# Patient Record
Sex: MALE | Race: WHITE | NOT HISPANIC OR LATINO | Employment: FULL TIME | URBAN - METROPOLITAN AREA
[De-identification: names, ages, dates, MRNs, and addresses within clinical notes are randomized per-mention and may not be internally consistent; named-entity substitution may affect disease eponyms.]

---

## 2017-10-22 ENCOUNTER — HOSPITAL ENCOUNTER (EMERGENCY)
Facility: HOSPITAL | Age: 47
Discharge: HOME | End: 2017-10-22
Attending: EMERGENCY MEDICINE | Admitting: EMERGENCY MEDICINE
Payer: COMMERCIAL

## 2017-10-22 ENCOUNTER — APPOINTMENT (EMERGENCY)
Dept: RADIOLOGY | Facility: HOSPITAL | Age: 47
End: 2017-10-22
Payer: COMMERCIAL

## 2017-10-22 VITALS
HEART RATE: 95 BPM | BODY MASS INDEX: 20.32 KG/M2 | WEIGHT: 150 LBS | TEMPERATURE: 96.9 F | OXYGEN SATURATION: 98 % | DIASTOLIC BLOOD PRESSURE: 76 MMHG | RESPIRATION RATE: 20 BRPM | HEIGHT: 72 IN | SYSTOLIC BLOOD PRESSURE: 147 MMHG

## 2017-10-22 DIAGNOSIS — S92.403A: Primary | ICD-10-CM

## 2017-10-22 DIAGNOSIS — S80.812A ABRASION OF ANTERIOR LEFT LOWER LEG, INITIAL ENCOUNTER: ICD-10-CM

## 2017-10-22 DIAGNOSIS — S97.112A CRUSHING INJURY OF GREAT TOE OF LEFT FOOT, INITIAL ENCOUNTER: ICD-10-CM

## 2017-10-22 PROCEDURE — 90715 TDAP VACCINE 7 YRS/> IM: CPT | Performed by: EMERGENCY MEDICINE

## 2017-10-22 PROCEDURE — 99283 EMERGENCY DEPT VISIT LOW MDM: CPT

## 2017-10-22 PROCEDURE — 73660 X-RAY EXAM OF TOE(S): CPT

## 2017-10-22 PROCEDURE — 90471 IMMUNIZATION ADMIN: CPT

## 2017-10-22 RX ORDER — IBUPROFEN 600 MG/1
600 TABLET ORAL ONCE
Status: COMPLETED | OUTPATIENT
Start: 2017-10-22 | End: 2017-10-22

## 2017-10-22 RX ADMIN — IBUPROFEN 600 MG: 600 TABLET, FILM COATED ORAL at 08:42

## 2017-10-22 RX ADMIN — TETANUS TOXOID, REDUCED DIPHTHERIA TOXOID AND ACELLULAR PERTUSSIS VACCINE, ADSORBED 0.5 ML: 5; 2.5; 8; 8; 2.5 SUSPENSION INTRAMUSCULAR at 08:49

## 2017-10-22 NOTE — ED PROVIDER NOTES
History  Chief Complaint   Patient presents with    Toe Injury     Pt reports last night he dropped a 25 lb weight onto left big toe, presents with eccymosis and swelling to left big toe and small abrasion to left shin      This is a 76-year-old male complaining of left 1st toe pain since yesterday when he dropped a 25 lb weight by accident on to his toe while cleaning at home  Pain is like a throbbing without radiation it is worse with movement  He did not take any pain medication  He has an associated abrasion at his shin  His last tetanus vaccine is greater than 10 years ago  None       Past Medical History:   Diagnosis Date    Diabetes mellitus ([facility] [address] )        History reviewed  No pertinent surgical history  History reviewed  No pertinent family history  I have reviewed and agree with the history as documented  Social History   Substance Use Topics    Smoking status: Current Every Day Smoker     Packs/day: 0 50     Types: Cigarettes    Smokeless tobacco: Never Used    Alcohol use Yes      Comment: drinks 2 to 3 shots liquor daily         Review of Systems   All other systems reviewed and are negative  Physical Exam  ED Triage Vitals [10/22/17 0809]   Temperature Pulse Respirations Blood Pressure SpO2   (!) 96 9 °F (36 1 °C) 99 20 152/78 98 %      Temp Source Heart Rate Source Patient Position - Orthostatic VS BP Location FiO2 (%)   Tympanic Monitor Lying Right arm --      Pain Score       2           Physical Exam   Constitutional: He appears well-developed and well-nourished  HENT:   Mouth/Throat: Oropharynx is clear and moist    Eyes: Conjunctivae and EOM are normal  Pupils are equal, round, and reactive to light  Neck: Normal range of motion  Neck supple  No spinous process tenderness present  Cardiovascular: Normal rate, regular rhythm, normal heart sounds and intact distal pulses  Pulmonary/Chest: Effort normal and breath sounds normal  No respiratory distress   He has no wheezes  Abdominal: Soft  Bowel sounds are normal  He exhibits no distension  There is no tenderness  Musculoskeletal: Normal range of motion  Legs:       Left foot: There is tenderness, bony tenderness and swelling  There is normal range of motion  Feet:    Neurological: He is alert  He has normal strength  No sensory deficit  GCS eye subscore is 4  GCS verbal subscore is 5  GCS motor subscore is 6  Skin: Skin is warm and dry  No rash noted  Psychiatric: He has a normal mood and affect  Nursing note and vitals reviewed  ED Medications  Medications   ibuprofen (MOTRIN) tablet 600 mg (600 mg Oral Given 10/22/17 0842)   tetanus-diphtheria-acellular pertussis (BOOSTRIX) IM injection 0 5 mL (0 5 mL Intramuscular Given 10/22/17 0849)       Diagnostic Studies  Labs Reviewed - No data to display    XR toe left great min 2 views   ED Interpretation   Fractures of proximal and distal phalynx first toe on left read   by me      Final Result      Fracture of the distal phalanx without displacement           Workstation performed: [id number]             Procedures  Procedures      Phone Contacts  ED Phone Contact    ED Course  ED Course                                MDM  Number of Diagnoses or Management Options  Abrasion of anterior left lower leg, initial encounter: new and requires workup  Closed fracture of great toe: new and requires workup  Crushing injury of great toe of left foot, initial encounter: new and requires workup     Amount and/or Complexity of Data Reviewed  Clinical lab tests: ordered and reviewed    Patient Progress  Patient progress: improved    CritCare Time    Disposition  Final diagnoses:   Closed fracture of great toe - acute left   Crushing injury of great toe of left foot, initial encounter   Abrasion of anterior left lower leg, initial encounter     ED Disposition     ED Disposition Condition Comment    Discharge  Jace Pablo discharge to home/self care     Condition at discharge: Good        Follow-up Information    None       There are no discharge medications for this patient  No discharge procedures on file      ED Provider  Electronically Signed by       Lady Eryn DO  10/23/17 6318

## 2017-10-22 NOTE — DISCHARGE INSTRUCTIONS
Abrasion   WHAT YOU NEED TO KNOW:   An abrasion is a scrape on your skin  It happens when your skin rubs against a rough surface  Some examples of an abrasion include rug burn, a skinned elbow, or road rash  Abrasions can be many shapes and sizes  The wound may hurt, bleed, bruise, or swell  DISCHARGE INSTRUCTIONS:   Return to the emergency department if:   · The bleeding does not stop after 10 minutes of firm pressure  · You cannot rinse one or more foreign objects out of your wound  · You have red streaks on your skin coming from your wound  Contact your healthcare provider if:   · You have a fever or chills  · Your abrasion is red, warm, swollen, or draining pus  · You have questions or concerns about your condition or care  Care for your abrasion:   · Wash your hands and dry them with a clean towel  · Press a clean cloth against your wound to stop any bleeding  · Rinse your wound with a lot of clean water  Do not use harsh soap, alcohol, or iodine solutions  · Use a clean, wet cloth to remove any objects, such as small pieces of rocks or dirt  · Rub antibiotic ointment on your wound  This may help prevent infection and help your wound heal     · Cover the wound with a non-stick bandage  Change the bandage daily, and if gets wet or dirty  Follow up with your healthcare provider as directed:  Write down your questions so you remember to ask them during your visits  © 2017 [address] [employer] Information is for End User's use only and may not be sold, redistributed or otherwise used for commercial purposes  All illustrations and images included in CareNotes® are the copyrighted property of [employer]  or [employer] Storey  The above information is an  only  It is not intended as medical advice for individual conditions or treatments   Talk to your doctor, nurse or pharmacist before following any medical regimen to see if it is safe and effective for you     Crush Injury   WHAT YOU NEED TO KNOW:   A crush injury happens when part of your body is trapped under a heavy object, or trapped between objects  You may have one or more broken bones  You may also have tissue damage  The damage can cause pain, numbness, and weakness  A crush injury can cause serious problems that need immediate treatment  DISCHARGE INSTRUCTIONS:   Medicines: You may need any of the following:  · Prescription pain medicine  may be given  Ask how to take this medicine safely  · Antibiotics  prevent or fight a bacterial infection  · Take your medicine as directed  Contact your healthcare provider if you think your medicine is not helping or if you have side effects  Tell him of her if you are allergic to any medicine  Keep a list of the medicines, vitamins, and herbs you take  Include the amounts, and when and why you take them  Bring the list or the pill bottles to follow-up visits  Carry your medicine list with you in case of an emergency  Call 911 for any of the following:   · You have chest pain, shortness of breath, or cannot think clearly  Return to the emergency department if:   · The skin near the injured area turns blue or white or feels cold and numb  · You feel pain that increases when you stretch or bend the injured area  · The injured area swells or feels tight or hard  · You have pale or shiny skin near your injury  · You have numbness or trouble moving your injured arm or leg  · Your wound is draining pus or smells bad  · Your pain or swelling does not go away or gets worse, even after you take medicine  · Blood soaks through your bandage or cast   Contact your healthcare provider if:   · You have questions or concerns about your condition or care  Follow up with your healthcare provider as directed: You may need more x-rays, or a cast for a broken bone  You may also need treatment for muscle, nerve, or kidney damage   Your healthcare provider may refer you to an orthopedic surgeon or other specialist  Write down your questions so you remember to ask them during your visits  Apply ice:  Ice helps decrease pain and swelling  Ice may also help decrease tissue damage  Use an ice pack, or put crushed ice in a plastic bag  Cover it with a towel  Apply it to the injured area for 20 minutes every hour, or as directed  Ask your healthcare provider how many times each day to apply ice, and for how many days  Elevate the injured area as directed: If possible, raise the area as often as you can  This will help decrease swelling and pain  Prop it on pillows to keep it elevated comfortably  Do not smoke:  Smoking can cause tissue damage and delay healing  Ask your healthcare provider for more information if you currently smoke and need help quitting  Go to therapy as directed:  A physical therapist can teach you exercises to help improve movement and strength  Physical therapy can also help decrease pain and loss of function  An occupational therapist can help you find ways to do daily activities and care for yourself  © 2017 [address] [facility] Information is for End User's use only and may not be sold, redistributed or otherwise used for commercial purposes  All illustrations and images included in CareNotes® are the copyrighted property of A D A M , Inc  or [employer]  The above information is an  only  It is not intended as medical advice for individual conditions or treatments  Talk to your doctor, nurse or pharmacist before following any medical regimen to see if it is safe and effective for you  Toe Fracture   WHAT YOU NEED TO KNOW:   A toe fracture is a break in 1 or more of the bones in your toe  It is most commonly caused by a direct blow to the toe  The bones in your toe may just be broken, or they may be out of place or     DISCHARGE INSTRUCTIONS:   Seek care immediately if:   · Blood soaks through your bandage  · You have severe pain in your toe  · Your toe is cold or numb  Contact your healthcare provider if:   · You have a fever  · Your pain does not go away, even after treatment  · Your toe continues to hurt even after it has healed  · You have questions or concerns about your condition or care  Medicines: You may need any of the following:  · NSAIDs , such as ibuprofen, help decrease swelling, pain, and fever  This medicine is available with or without a doctor's order  NSAIDs can cause stomach bleeding or kidney problems in certain people  If you take blood thinner medicine, always ask your healthcare provider if NSAIDs are safe for you  Always read the medicine label and follow directions  · Prescription pain medicine  may be given  Ask your healthcare provider how to take this medicine safely  Some prescription pain medicines contain acetaminophen  Do not take other medicines that contain acetaminophen without talking to your healthcare provider  Too much acetaminophen may cause liver damage  Prescription pain medicine may cause constipation  Ask your healthcare provider how to prevent or treat constipation  · Antibiotics  treat a bacterial infection  You may need antibiotics if you have an open wound  · Take your medicine as directed  Contact your healthcare provider if you think your medicine is not helping or if you have side effects  Tell him of her if you are allergic to any medicine  Keep a list of the medicines, vitamins, and herbs you take  Include the amounts, and when and why you take them  Bring the list or the pill bottles to follow-up visits  Carry your medicine list with you in case of an emergency  Self-care:   · Use raphael tape, an elastic bandage, or a splint as directed  These help keep your toe in its correct position as it heals  Raphael tape is when your fractured toe and the toe next to it are taped together       · Use support devices including a cane, crutches, walking boot, or hard soled shoe as directed  These help protect your broken toe and limit movement so it can heal     · Rest  your toe so that it can heal  Return to normal activities as directed  · Apply ice  on your toe for 15 to 20 minutes every hour or as directed  Use an ice pack, or put crushed ice in a plastic bag  Cover it with a towel  Ice helps prevent tissue damage and decreases swelling and pain  · Elevate  your toe above the level of your heart as often as you can  This will help decrease swelling and pain  Prop your toe on pillows or blankets to keep it elevated comfortably  Follow up with your healthcare provider as directed: You may need to see a podiatrist in 1 to 2 weeks  Write down your questions so you remember to ask them during your visits  © 2017 [address] [employer] Information is for End User's use only and may not be sold, redistributed or otherwise used for commercial purposes  All illustrations and images included in CareNotes® are the copyrighted property of A D A M , Inc  or Julius Storey  The above information is an  only  It is not intended as medical advice for individual conditions or treatments  Talk to your doctor, nurse or pharmacist before following any medical regimen to see if it is safe and effective for you  How to Stop Smoking   WHAT YOU NEED TO KNOW:   You will improve your health and the health of others around you if you stop smoking  Your risk for heart and lung disease, cancer, stroke, heart attack, and vision problems will also decrease  You can benefit from quitting no matter how long you have smoked  DISCHARGE INSTRUCTIONS:   Prepare to stop smoking:  Nicotine is a highly addictive drug found in cigarettes  Withdrawal symptoms can happen when you stop smoking and make it hard to quit  These include anxiety, depression, irritability, trouble sleeping, and increased appetite   You increase your chances of success if you prepare to quit  · Set a quit date  Darold Cooks a date that is within the next 2 weeks  Do not pick a day that you think may be stressful or busy  Write down the day or [place] it on your calender  · Tell friends and family that you plan to quit  Explain that you may have withdrawal symptoms when you try to quit  Ask them to support you  They may be able to encourage you and help reduce your stress to make it easier for you to quit  · Make a list of your reasons for quitting  Put the list somewhere you will see it every day, such as your refrigerator  You can look at the list when you have a craving  · Remove all tobacco and nicotine products from your home, car, and workplace  Also, remove anything else that will tempt you to smoke, such as lighters, matches, or ashtrays  Clean your car, home, and places at work that smell like smoke  The smell of smoke can trigger a craving  · Identify triggers that make you want to smoke  This may include activities, feelings, or people  Also write down 1 way you can deal with each of your triggers  For example, if you want to smoke as soon as you wake up, plan another activity during this time, such as exercise  · Make a plan for how you will quit  Learn about the tools that can help you quit, such as medicine, counseling, or nicotine replacement therapy  Choose at least 2 options to help you quit  Tools to help you stop smoking:   · Counseling  from a trained healthcare provider can provide you with support and skills to quit smoking  The provider will also teach you to manage your withdrawal symptoms and cravings  You may receive counseling from one counselor, in group therapy, or through phone therapy called a quit line  · Nicotine replacement therapy (NRT)  such as nicotine patches, gum, or lozenges may help reduce your nicotine cravings  You may get these without a doctor's order   Do not use e-cigarettes or smokeless tobacco in place of cigarettes or to help you quit  They still contain nicotine  · Prescription medicines  such as nasal sprays or nicotine inhalers may help reduce your withdrawal symptoms  Other medicines may also be used to reduce your urge to smoke  Ask your healthcare provider about these medicines  You may need to start certain medicines 2 weeks before your quit date for them to work well  · Hypnosis  is a practice that helps guide you through thoughts and feelings  Hypnosis may help decrease your cravings and make you more willing to quit  · Acupuncture therapy  uses very thin needles to balance energy channels in the body  This is thought to help decrease cravings and symptoms of nicotine withdrawal      · Support groups  let you talk to others who are trying to quit or have already quit  It may be helpful to speak with others about how they quit  Manage your cravings:   · Avoid situations, people, and places that tempt you to smoke  Go to nonsmoking places, such as libraries or restaurants  Understand what tempts you and try to avoid these things  · Keep your hands busy  Hold things such as a stress ball or pen  · Put candy or toothpicks in your mouth  Keep lollipops, sugarless gum, or toothpicks with you at all times  · Do not have alcohol or caffeine  These drinks may tempt you to smoke  Drink healthy liquids such as water or juice instead  · Reward yourself when you resist your cravings  Rewards will motivate you and help you stay positive  · Do an activity that distracts you from your craving  Examples include going for a walk, exercising, or cleaning  Prevent weight gain after you quit:  You may gain a few pounds after you quit smoking  It is healthier for you to gain a few pounds than to continue to smoke  The following can help you prevent weight gain:  · Eat healthy foods    These include fruits, vegetables, whole-grain breads, low-fat dairy products, beans, lean meats, and fish  Eat healthy snacks, such as low-fat yogurt, if you get hungry between meals  · Drink water before, during, and between meals  This will make your stomach feel full and help prevent you from overeating  Ask your healthcare provider how much liquid to drink each day and which liquids are best for you  · Exercise  Take a walk or do some kind of exercise every day  Ask your healthcare provider what exercise is right for you  This may help reduce your cravings and reduce stress  For support and more information:   · Smokefree  gov  Phone: 0- 581 - 829-3540  Web Address: www [employer]  [place]  © 2017 [address] [employer] Information is for End User's use only and may not be sold, redistributed or otherwise used for commercial purposes  All illustrations and images included in CareNotes® are the copyrighted property of [employer]  or Julius Storey  The above information is an  only  It is not intended as medical advice for individual conditions or treatments  Talk to your doctor, nurse or pharmacist before following any medical regimen to see if it is safe and effective for you

## 2017-10-26 ENCOUNTER — ALLSCRIPTS OFFICE VISIT (OUTPATIENT)
Dept: OTHER | Facility: OTHER | Age: 47
End: 2017-10-26

## 2017-10-27 NOTE — PROGRESS NOTES
Assessment  1  Closed nondisplaced fracture of distal phalanx of right great toe, initial encounter (826 0)   ([id number])    Plan  Closed nondisplaced fracture of distal phalanx of right great toe, initial encounter    · Follow-up visit in 6 weeks Evaluation and Treatment  Follow-up  Status: Hold For -  Scheduling  Requested for: [id number]    Discussion/Summary    48-year-old male with a left great toe fracture, nondisplaced  He may weightbear as tolerated  Gradually wean out of the postop shoe to a normal shoe when he feels ready  He has no restrictions  We discussed possibility of losing his nail  I will see him back in six weeks with x-rays of the left great toe  documentation was recorded using voice recognition software and errors may be noted  Chief Complaint  Left toe injury      History of Present Illness  HPI: 48-year-old male who had a 25 lb weight fall onto his left great toe on October 22, 2017  He has been keeping clean dressings over it and wrapping it gently and wearing a postop shoe  He has been able to continue working  He has no significant issues with that  His pain is well controlled  He notices the bruising has worsened slightly over the last few days  medical intake form was reviewed      Review of Systems    Constitutional: No fever or chills, feels well, no tiredness, no recent weight loss or weight gain  Eyes: No complaints of red eyes, no eyesight problems  ENT: no complaints of loss of hearing, no nosebleeds, no sore throat  Cardiovascular: No complaints of chest pain, no palpitations, no leg claudication or lower extremity edema  Respiratory: No complaints of shortness of breath, no wheezing, no cough  Gastrointestinal: No complaints of abdominal pain, no constipation, no nausea or vomiting, no diarrhea or bloody stools  Genitourinary: No complaints of dysuria or incontinence, no hesitancy, no nocturia     Musculoskeletal: limb pain-- and-- limb swelling, but-- as noted in HPI  Integumentary: No complaints of skin rash or lesion, no itching or dry skin, no skin wounds  Neurological: No complaints of headache, no confusion, no numbness or tingling, no dizziness  Psychiatric: No suicidal thoughts, no anxiety, no depression  Endocrine: No muscle weakness, no frequent urination, no excessive thirst, no feelings of weakness  ROS reviewed  Past Medical History    The active problems and past medical history were reviewed and updated today  Surgical History    The surgical history was reviewed and updated today  Family History  Mother    · No pertinent family history  Father    · No pertinent family history    The family history was reviewed and updated today  Social History   · Current every day smoker (305 1) (F17 200)   · No alcohol use  The social history was reviewed and updated today  Current Meds   1  No Reported Medications Recorded    The medication list was reviewed and updated today  Allergies  1  No Known Drug Allergies    Vitals   Recorded: [id number] 10:12AM Recorded: [id number] 10:11AM   Heart Rate 91    Systolic 637    Diastolic 87    Height  6 ft    Weight  154 lb 8 0 oz   BMI Calculated  20 95   BSA Calculated  1 91     Physical Exam      Left Foot: Inspection and Palpation: Normal    ROM: Normal    Strength: Normal    Stability: Normal    Right Foot: Appearance: swelling  Tenderness: None except the great toe  Flexion and extension of the great toe is intact, but slightly limited  Special Tests: Ecchymosis around the base of all the toes  Constitutional - General appearance: Normal    Musculoskeletal - Gait and station: Abnormal  Gait evaluation demonstrated limping on the left  -- Lower extremity compartments: Normal    Cardiovascular - Pulses: Normal    Neurologic - Sensation: Normal -- Lower extremity peripheral neuro exam: Normal    Psychiatric - Orientation to person, place, and time: Normal -- Mood and affect: Normal       Results/Data  I personally reviewed the films/images/results in the office today  My interpretation follows  X-ray Review X-rays of the left great toe show a nondisplaced fracture of the distal phalanx        Signatures   Electronically signed by : Ila Fabry, MD; Oct 26 2017 10:40AM EST                       (Author)

## 2017-12-05 DIAGNOSIS — S92.424A: ICD-10-CM

## 2018-01-14 VITALS
BODY MASS INDEX: 20.93 KG/M2 | HEART RATE: 91 BPM | DIASTOLIC BLOOD PRESSURE: 87 MMHG | SYSTOLIC BLOOD PRESSURE: 141 MMHG | HEIGHT: 72 IN | WEIGHT: 154.5 LBS